# Patient Record
Sex: MALE | Race: WHITE | URBAN - METROPOLITAN AREA
[De-identification: names, ages, dates, MRNs, and addresses within clinical notes are randomized per-mention and may not be internally consistent; named-entity substitution may affect disease eponyms.]

---

## 2021-06-25 ENCOUNTER — OFFICE VISIT (OUTPATIENT)
Dept: URBAN - METROPOLITAN AREA CLINIC 44 | Facility: CLINIC | Age: 76
End: 2021-06-25
Payer: MEDICARE

## 2021-06-25 DIAGNOSIS — Z98.890 OTHER SPECIFIED POSTPROCEDURAL STATE: ICD-10-CM

## 2021-06-25 DIAGNOSIS — H40.013 OPEN ANGLE WITH BORDERLINE FINDINGS, LOW RISK, BILATERAL: Primary | ICD-10-CM

## 2021-06-25 DIAGNOSIS — H04.123 TEAR FILM INSUFFICIENCY OF BILATERAL LACRIMAL GLANDS: ICD-10-CM

## 2021-06-25 DIAGNOSIS — H52.13 MYOPIA, BILATERAL: ICD-10-CM

## 2021-06-25 DIAGNOSIS — H25.13 AGE-RELATED NUCLEAR CATARACT, BILATERAL: ICD-10-CM

## 2021-06-25 PROCEDURE — 76514 ECHO EXAM OF EYE THICKNESS: CPT | Performed by: OPHTHALMOLOGY

## 2021-06-25 PROCEDURE — 92020 GONIOSCOPY: CPT | Performed by: OPHTHALMOLOGY

## 2021-06-25 PROCEDURE — 99204 OFFICE O/P NEW MOD 45 MIN: CPT | Performed by: OPHTHALMOLOGY

## 2021-06-25 PROCEDURE — 92083 EXTENDED VISUAL FIELD XM: CPT | Performed by: OPHTHALMOLOGY

## 2021-06-25 PROCEDURE — 92133 CPTRZD OPH DX IMG PST SGM ON: CPT | Performed by: OPHTHALMOLOGY

## 2021-06-25 ASSESSMENT — INTRAOCULAR PRESSURE
OD: 16
OS: 16

## 2021-06-25 NOTE — IMPRESSION/PLAN
Impression: Open angle with borderline findings, low risk, bilateral: H40.013.

 - Denies Family Hx of Glaucoma, Sulfa Allergy, Heart or Lung Problems, Sleep Apnea,  Hx of Migraines Plan: PLAN: Thank You for the referral Dr. Real Soto!! VFT is overall full ou and OCT has thinning od ; Pachs done today will use to follow - thick ou m and  IOP is reasonable ou, nerve apeparance may represent physiologic cupping and myopic tilt, so would cont observation at this point and may follow in general clinic, return to glaucoma as needed  ((TARGET ~< to determine OU)) TESTS:  
6/25/21 Visual Field - OD: Fair-inf scatter; OS: Fair-central scatter 6/25/21 OCT - OD: Fair-76 ; inf thinning, artifacts; OS: Fair-86 ; normal NFL
6/25/21 Pachymetry - OD: 618, thick, decreased risk ; OS: 656, thick, decreased risk Discussed glaucoma diagnosis in detail with patient. Emphasized and explained compliance. Poor compliance can lead to blindness.  Educational materials provided: 6/25/21

## 2021-06-25 NOTE — IMPRESSION/PLAN
Impression: Age-related nuclear cataract, bilateral: H25.13.
- monovision, left -near 
- lasik ou  Plan: Eval in general clinic

## 2021-07-15 ENCOUNTER — OFFICE VISIT (OUTPATIENT)
Dept: URBAN - METROPOLITAN AREA CLINIC 44 | Facility: CLINIC | Age: 76
End: 2021-07-15
Payer: MEDICARE

## 2021-07-15 PROCEDURE — 99204 OFFICE O/P NEW MOD 45 MIN: CPT | Performed by: OPTOMETRIST

## 2021-07-15 ASSESSMENT — INTRAOCULAR PRESSURE
OD: 16
OS: 16

## 2021-07-15 ASSESSMENT — KERATOMETRY
OD: 42.75
OS: 44.50

## 2021-07-15 ASSESSMENT — VISUAL ACUITY
OS: 20/40
OD: 20/30

## 2021-07-15 NOTE — IMPRESSION/PLAN
Impression: Combined forms of age-related cataract, bilateral: H25.813. Plan: Discussed cataracts with patient. Discussed treatment options. Surgical treatment is recommended. Surgical risks and benefits discussed. Patient elects surgical treatment. Recommend surgery . Patient is candidate/interested in multifocal, toric, standard, LenSx and ORA. Aim OD: Aim OS: Patient will need glasses for full time wear. OS astigmatism. Gogo Neville  LASIK OU

## 2021-08-19 ENCOUNTER — OFFICE VISIT (OUTPATIENT)
Dept: URBAN - METROPOLITAN AREA CLINIC 44 | Facility: CLINIC | Age: 76
End: 2021-08-19
Payer: MEDICARE

## 2021-08-19 DIAGNOSIS — Z01.818 ENCOUNTER FOR OTHER PREPROCEDURAL EXAMINATION: Primary | ICD-10-CM

## 2021-08-19 DIAGNOSIS — H25.813 COMBINED FORMS OF AGE-RELATED CATARACT, BILATERAL: Primary | ICD-10-CM

## 2021-08-19 PROCEDURE — 99203 OFFICE O/P NEW LOW 30 MIN: CPT | Performed by: PHYSICIAN ASSISTANT

## 2021-08-19 ASSESSMENT — PACHYMETRY
OD: 24.92
OS: 4.02
OD: 3.85
OS: 24.65

## 2021-08-26 ENCOUNTER — PRE-OPERATIVE VISIT (OUTPATIENT)
Dept: URBAN - METROPOLITAN AREA CLINIC 44 | Facility: CLINIC | Age: 76
End: 2021-08-26
Payer: MEDICARE

## 2021-08-26 DIAGNOSIS — H52.203 BILATERAL ASTIGMATISM: ICD-10-CM

## 2021-08-26 DIAGNOSIS — H25.811 COMBINED FORMS OF AGE-RELATED CATARACT, RIGHT EYE: ICD-10-CM

## 2021-08-26 PROCEDURE — 99214 OFFICE O/P EST MOD 30 MIN: CPT | Performed by: OPHTHALMOLOGY

## 2021-08-26 ASSESSMENT — INTRAOCULAR PRESSURE
OD: 17
OS: 16

## 2021-08-26 NOTE — IMPRESSION/PLAN
Impression: Open angle with borderline findings, low risk, bilateral: H40.013.

 - Denies Family Hx of Glaucoma, Sulfa Allergy, Heart or Lung Problems, Sleep Apnea,  Hx of Migraines Plan: PLAN: Not on any glc meds - followed in general clinic ; test reviewed, IOP is   stable ou  and so may proceed with cataract surgery with NO MIGS and Discussed glaucoma may limit vision after surgery. Discussed possible unmasking of scotoma after surgery. ((TARGET ~< to determine OU)) TESTS:  
6/25/21 Visual Field - OD: Fair-inf scatter; OS: Fair-central scatter 6/25/21 OCT - OD: Fair-76 ; inf thinning, artifacts; OS: Fair-86 ; normal NFL
6/25/21 Pachymetry - OD: 618, thick, decreased risk ; OS: 656, thick, decreased risk Discussed glaucoma diagnosis in detail with patient. Emphasized and explained compliance. Poor compliance can lead to blindness.  Educational materials provided: 6/25/21

## 2021-08-26 NOTE — IMPRESSION/PLAN
Impression: Bilateral astigmatism: H52.203. Plan: Discussed with patient that LRI will attempt to reduce the astigmatism but will not get rid of all astigmatism and will need glasses after surgery. Discussed with patient, understands this may limit vision after surgery. Also discussed unmasking of astigmatism.

## 2021-08-26 NOTE — IMPRESSION/PLAN
Impression: Tear film insufficiency of bilateral lacrimal glands: H04.123. Plan: use ATs. Discussed with patient, understands this may limit vision after surgery.

## 2021-08-26 NOTE — IMPRESSION/PLAN
Impression: Other specified postprocedural state: Z98.890. Plan: s/p LASIK OU. , monovision . Discussed with patient, understands this may limit vision after surgery.

## 2021-09-02 ENCOUNTER — SURGERY (OUTPATIENT)
Dept: URBAN - METROPOLITAN AREA SURGERY 19 | Facility: SURGERY | Age: 76
End: 2021-09-02
Payer: MEDICARE

## 2021-09-02 PROCEDURE — 66984 XCAPSL CTRC RMVL W/O ECP: CPT | Performed by: OPHTHALMOLOGY

## 2021-09-03 ENCOUNTER — POST-OPERATIVE VISIT (OUTPATIENT)
Dept: URBAN - METROPOLITAN AREA CLINIC 44 | Facility: CLINIC | Age: 76
End: 2021-09-03

## 2021-09-03 PROCEDURE — 99024 POSTOP FOLLOW-UP VISIT: CPT | Performed by: OPTOMETRIST

## 2021-09-03 ASSESSMENT — INTRAOCULAR PRESSURE: OS: 18

## 2021-09-03 NOTE — IMPRESSION/PLAN
Impression: S/P Cataract Extraction by phacoemulsification with IOL placement; Astigmatic Keratotomy; ORA OS - 1 Day. Encounter for surgical aftercare following surgery on a sense organ  Z48.810. Plan: Post-op instructions reviewed.

## 2021-09-09 ENCOUNTER — POST-OPERATIVE VISIT (OUTPATIENT)
Dept: URBAN - METROPOLITAN AREA CLINIC 44 | Facility: CLINIC | Age: 76
End: 2021-09-09
Payer: MEDICARE

## 2021-09-09 PROCEDURE — 99024 POSTOP FOLLOW-UP VISIT: CPT | Performed by: OPTOMETRIST

## 2021-09-09 ASSESSMENT — VISUAL ACUITY
OS: 20/20
OD: 20/30

## 2021-09-09 ASSESSMENT — INTRAOCULAR PRESSURE
OD: 11
OS: 11

## 2021-09-09 NOTE — IMPRESSION/PLAN
Impression: S/P Cataract Extraction by phacoemulsification with IOL placement; Astigmatic Keratotomy; ORA OS - 7 Days. Encounter for surgical aftercare following surgery on a sense organ  Z48.810.  Plan: plan MONOV.  aim dist OD

## 2021-09-09 NOTE — IMPRESSION/PLAN
Impression: S/P Cataract Extraction by phacoemulsification with IOL placement; Astigmatic Keratotomy; ORA OS - 7 Days. Encounter for surgical aftercare following surgery on a sense organ  Z48.810.  Plan:

## 2021-09-16 ENCOUNTER — SURGERY (OUTPATIENT)
Dept: URBAN - METROPOLITAN AREA SURGERY 19 | Facility: SURGERY | Age: 76
End: 2021-09-16
Payer: MEDICARE

## 2021-09-16 PROCEDURE — 66984 XCAPSL CTRC RMVL W/O ECP: CPT | Performed by: OPHTHALMOLOGY

## 2021-09-17 ENCOUNTER — POST-OPERATIVE VISIT (OUTPATIENT)
Dept: URBAN - METROPOLITAN AREA CLINIC 44 | Facility: CLINIC | Age: 76
End: 2021-09-17
Payer: MEDICARE

## 2021-09-17 PROCEDURE — 99024 POSTOP FOLLOW-UP VISIT: CPT | Performed by: OPTOMETRIST

## 2021-09-17 ASSESSMENT — INTRAOCULAR PRESSURE: OD: 10

## 2021-09-17 NOTE — IMPRESSION/PLAN
Impression: S/P Cataract Extraction by phacoemulsification with IOL placement; ORA OD - 1 Day. Encounter for surgical aftercare following surgery on a sense organ  Z48.810. Plan: Post-op instructions reviewed.

## 2021-10-14 ENCOUNTER — POST-OPERATIVE VISIT (OUTPATIENT)
Dept: URBAN - METROPOLITAN AREA CLINIC 44 | Facility: CLINIC | Age: 76
End: 2021-10-14
Payer: MEDICARE

## 2021-10-14 PROCEDURE — 99024 POSTOP FOLLOW-UP VISIT: CPT | Performed by: OPTOMETRIST

## 2021-10-14 ASSESSMENT — INTRAOCULAR PRESSURE
OD: 13
OS: 13

## 2021-10-14 ASSESSMENT — VISUAL ACUITY
OS: 20/25
OD: 20/25

## 2021-10-14 NOTE — IMPRESSION/PLAN
Impression: S/P Cataract Extraction by phacoemulsification with IOL placement; ORA OD - 28 Days. Encounter for surgical aftercare following surgery on a sense organ  Z48.810.  Plan: pt happy with present glasses

## 2021-10-29 ENCOUNTER — POST-OPERATIVE VISIT (OUTPATIENT)
Dept: URBAN - METROPOLITAN AREA CLINIC 44 | Facility: CLINIC | Age: 76
End: 2021-10-29
Payer: MEDICARE

## 2021-10-29 DIAGNOSIS — Z48.810 ENCOUNTER FOR SURGICAL AFTERCARE FOLLOWING SURGERY ON A SENSE ORGAN: Primary | ICD-10-CM

## 2021-10-29 DIAGNOSIS — H52.4 PRESBYOPIA: ICD-10-CM

## 2021-10-29 PROCEDURE — 99024 POSTOP FOLLOW-UP VISIT: CPT | Performed by: OPTOMETRIST

## 2021-10-29 ASSESSMENT — VISUAL ACUITY
OD: 20/20
OS: 20/20

## 2021-10-29 NOTE — IMPRESSION/PLAN
Impression: S/P Cataract Extraction by phacoemulsification with IOL placement; ORA OD - 43 Days. Encounter for surgical aftercare following surgery on a sense organ  Z48.810.  Plan: new rx released OU

## 2022-10-18 ENCOUNTER — OFFICE VISIT (OUTPATIENT)
Dept: URBAN - METROPOLITAN AREA CLINIC 44 | Facility: CLINIC | Age: 77
End: 2022-10-18
Payer: MEDICARE

## 2022-10-18 DIAGNOSIS — H43.313 VITREOUS MEMBRANES AND STRANDS, BILATERAL: Primary | ICD-10-CM

## 2022-10-18 PROCEDURE — 92134 CPTRZ OPH DX IMG PST SGM RTA: CPT | Performed by: OPTOMETRIST

## 2022-10-18 PROCEDURE — 92014 COMPRE OPH EXAM EST PT 1/>: CPT | Performed by: OPTOMETRIST

## 2022-10-18 ASSESSMENT — KERATOMETRY
OD: 43.00
OS: 44.63

## 2022-10-18 ASSESSMENT — VISUAL ACUITY
OS: 20/25
OD: 20/25

## 2022-10-18 ASSESSMENT — INTRAOCULAR PRESSURE
OD: 15
OS: 15

## 2023-10-19 ENCOUNTER — OFFICE VISIT (OUTPATIENT)
Dept: URBAN - METROPOLITAN AREA CLINIC 44 | Facility: CLINIC | Age: 78
End: 2023-10-19
Payer: MEDICARE

## 2023-10-19 DIAGNOSIS — H04.123 DRY EYE SYNDROME OF BILATERAL LACRIMAL GLANDS: ICD-10-CM

## 2023-10-19 DIAGNOSIS — H43.313 VITREOUS MEMBRANES AND STRANDS, BILATERAL: Primary | ICD-10-CM

## 2023-10-19 PROCEDURE — 92134 CPTRZ OPH DX IMG PST SGM RTA: CPT | Performed by: OPTOMETRIST

## 2023-10-19 PROCEDURE — 92014 COMPRE OPH EXAM EST PT 1/>: CPT | Performed by: OPTOMETRIST

## 2023-10-19 ASSESSMENT — VISUAL ACUITY
OS: 20/20
OD: 20/20

## 2023-10-19 ASSESSMENT — KERATOMETRY
OS: 44.75
OD: 43.00

## 2023-10-19 ASSESSMENT — INTRAOCULAR PRESSURE
OS: 15
OD: 15

## 2024-10-18 ENCOUNTER — OFFICE VISIT (OUTPATIENT)
Dept: URBAN - METROPOLITAN AREA CLINIC 44 | Facility: CLINIC | Age: 79
End: 2024-10-18
Payer: MEDICARE

## 2024-10-18 DIAGNOSIS — H43.313 VITREOUS MEMBRANES AND STRANDS, BILATERAL: ICD-10-CM

## 2024-10-18 DIAGNOSIS — H04.123 DRY EYE SYNDROME OF BILATERAL LACRIMAL GLANDS: Primary | ICD-10-CM

## 2024-10-18 PROCEDURE — 99214 OFFICE O/P EST MOD 30 MIN: CPT | Performed by: OPTOMETRIST

## 2024-10-18 RX ORDER — CYCLOSPORINE 0.5 MG/ML
0.05 % EMULSION OPHTHALMIC
Qty: 30 | Refills: 3 | Status: ACTIVE
Start: 2024-10-18

## 2024-10-18 RX ORDER — CYCLOSPORINE 0.5 MG/ML
0.05 % EMULSION OPHTHALMIC
Qty: 5 | Refills: 0 | Status: INACTIVE
Start: 2024-10-18 | End: 2024-10-18

## 2024-10-18 ASSESSMENT — INTRAOCULAR PRESSURE
OD: 12
OS: 11

## 2024-10-18 ASSESSMENT — VISUAL ACUITY
OD: 20/20
OS: 20/20

## 2024-10-18 ASSESSMENT — KERATOMETRY
OS: 44.25
OD: 42.75